# Patient Record
Sex: FEMALE | Race: OTHER | NOT HISPANIC OR LATINO | URBAN - METROPOLITAN AREA
[De-identification: names, ages, dates, MRNs, and addresses within clinical notes are randomized per-mention and may not be internally consistent; named-entity substitution may affect disease eponyms.]

---

## 2023-04-19 PROBLEM — Z79.890 POSTMENOPAUSAL HRT (HORMONE REPLACEMENT THERAPY): Status: ACTIVE | Noted: 2018-05-04

## 2023-04-19 PROBLEM — Z98.890 STATUS POST D&C: Status: ACTIVE | Noted: 2017-10-27

## 2023-04-19 PROBLEM — N91.2 AMENORRHEA: Status: ACTIVE | Noted: 2017-09-05

## 2023-04-19 PROBLEM — R20.0 NUMBNESS AND TINGLING IN LEFT ARM: Status: ACTIVE | Noted: 2018-05-04

## 2023-04-19 PROBLEM — R20.2 NUMBNESS AND TINGLING IN LEFT ARM: Status: ACTIVE | Noted: 2018-05-04

## 2023-06-08 ENCOUNTER — TELEPHONE (OUTPATIENT)
Dept: NEUROLOGY | Facility: CLINIC | Age: 33
End: 2023-06-08
Payer: MEDICAID

## 2023-06-08 NOTE — TELEPHONE ENCOUNTER
Spoke with patient regarding needing to schedule an appointment. Advised patient that our Medicaid panel is full and do not have anything available. Patient v/u and will look elsewhere.

## 2023-06-08 NOTE — TELEPHONE ENCOUNTER
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    Can you please give this patient a call to schedule an appointment    Good call back number [phone]

## 2024-05-22 PROBLEM — Z3A.37 37 WEEKS GESTATION OF PREGNANCY: Status: ACTIVE | Noted: 2024-05-22

## 2024-05-22 PROBLEM — O10.919 HTN IN PREGNANCY, CHRONIC: Status: ACTIVE | Noted: 2024-05-22

## 2024-05-22 PROBLEM — Z34.90 TERM PREGNANCY: Status: ACTIVE | Noted: 2024-05-22

## 2024-10-17 ENCOUNTER — PATIENT MESSAGE (OUTPATIENT)
Dept: RESEARCH | Facility: HOSPITAL | Age: 34
End: 2024-10-17
Payer: MEDICAID